# Patient Record
Sex: FEMALE | Race: WHITE | NOT HISPANIC OR LATINO | URBAN - METROPOLITAN AREA
[De-identification: names, ages, dates, MRNs, and addresses within clinical notes are randomized per-mention and may not be internally consistent; named-entity substitution may affect disease eponyms.]

---
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
## 2023-06-07 NOTE — DISCHARGE NOTE PROVIDER - NSFOLLOWUPCLINICS_GEN_ALL_ED_FT
Pediatric Neurology  Pediatric Neurology  [address] [facility] [address] [place] [address]  Phone: [phone]  Fax: [phone]  Follow Up Time: None

## 2023-08-02 NOTE — DISCHARGE NOTE NURSING/CASE MANAGEMENT/SOCIAL WORK - FLU SEASON?
Yes... Dupixent Pregnancy And Lactation Text: This medication likely crosses the placenta but the risk for the fetus is uncertain. This medication is excreted in breast milk.

## 2023-12-16 NOTE — ED PROVIDER NOTE - RESPIRATORY, MLM
:patient's granddaughter states patient's doctor sent patient's medication to the wrong pharmacy. Patient has been off of her anti depressants for 5 days and patient is very anxious     Medication re-sent to local pharmacy per request.      Reason for Disposition  • Patient has refills remaining on their prescription    Protocols used: Medication Refill and Renewal Call-A-AH     Breath sounds clear and equal bilaterally.

## 2024-11-21 NOTE — ED PROVIDER NOTE - NO PERTINENT FAMILY HISTORY
<<----- Click to add NO pertinent Family History no abrasions, no jaundice, no lesions, no pruritis, and no rashes.

## 2025-03-11 NOTE — ED PEDIATRIC NURSE NOTE - CHILD ABUSE FACILITY
Appointment on 3/13/25 with  cancelled per Patient Prompt. Attempted to reach patient to reschedule. Outcome of phone call: LVM for patient to return call to reschedule.    
PIEDAD

## 2025-03-14 ENCOUNTER — APPOINTMENT (OUTPATIENT)
Dept: OBGYN | Facility: CLINIC | Age: 20
End: 2025-03-14
Payer: COMMERCIAL

## 2025-03-14 VITALS
DIASTOLIC BLOOD PRESSURE: 82 MMHG | BODY MASS INDEX: 20.14 KG/M2 | WEIGHT: 118 LBS | HEIGHT: 64 IN | SYSTOLIC BLOOD PRESSURE: 130 MMHG

## 2025-03-14 DIAGNOSIS — Z01.419 ENCOUNTER FOR GYNECOLOGICAL EXAMINATION (GENERAL) (ROUTINE) W/OUT ABNORMAL FINDINGS: ICD-10-CM

## 2025-03-14 DIAGNOSIS — N64.4 MASTODYNIA: ICD-10-CM

## 2025-03-14 DIAGNOSIS — N93.9 ABNORMAL UTERINE AND VAGINAL BLEEDING, UNSPECIFIED: ICD-10-CM

## 2025-03-14 DIAGNOSIS — N89.8 OTHER SPECIFIED NONINFLAMMATORY DISORDERS OF VAGINA: ICD-10-CM

## 2025-03-14 DIAGNOSIS — N63.20 UNSPECIFIED LUMP IN THE LEFT BREAST, UNSPECIFIED QUADRANT: ICD-10-CM

## 2025-03-14 PROCEDURE — 99459 PELVIC EXAMINATION: CPT

## 2025-03-14 PROCEDURE — 99385 PREV VISIT NEW AGE 18-39: CPT

## 2025-03-14 PROCEDURE — 36415 COLL VENOUS BLD VENIPUNCTURE: CPT

## 2025-03-16 LAB
DHEA-S SERPL-MCNC: 306 UG/DL
ESTIMATED AVERAGE GLUCOSE: 94 MG/DL
ESTRADIOL SERPL-MCNC: 38 PG/ML
FSH SERPL-MCNC: 9.6 IU/L
GLUCOSE SERPL-MCNC: 53 MG/DL
HBA1C MFR BLD HPLC: 4.9 %
HCG SERPL-MCNC: <1 MIU/ML
LH SERPL-ACNC: 30.4 IU/L
PROLACTIN SERPL-MCNC: 10.5 NG/ML
T4 FREE SERPL-MCNC: 1.5 NG/DL
TSH SERPL-ACNC: 1.19 UIU/ML

## 2025-03-18 LAB
BV BACTERIA RRNA VAG QL NAA+PROBE: NOT DETECTED
C GLABRATA RNA VAG QL NAA+PROBE: NOT DETECTED
CANDIDA RRNA VAG QL PROBE: NOT DETECTED
T VAGINALIS RRNA SPEC QL NAA+PROBE: NOT DETECTED
TESTOST FREE SERPL-MCNC: 0.7 PG/ML
TESTOST SERPL-MCNC: 53.8 NG/DL

## 2025-03-19 LAB — CYTOLOGY CVX/VAG DOC THIN PREP: NORMAL

## 2025-03-23 LAB — 17OHP SERPL-MCNC: 80 NG/DL

## 2025-04-18 ENCOUNTER — ASOB RESULT (OUTPATIENT)
Age: 20
End: 2025-04-18

## 2025-04-18 ENCOUNTER — APPOINTMENT (OUTPATIENT)
Dept: OBGYN | Facility: CLINIC | Age: 20
End: 2025-04-18
Payer: COMMERCIAL

## 2025-04-18 PROCEDURE — 76856 US EXAM PELVIC COMPLETE: CPT

## 2025-05-16 NOTE — ED PEDIATRIC TRIAGE NOTE - SPO2 (%)
eMERGENCY dEPARTMENT eNCOUnter      CHIEF COMPLAINT    Chief Complaint   Patient presents with    Psychiatric Problem       HPI    Vanessa Lee is a 16 year old female who presents to the emergency department for evaluation.  Patient presents with an apparent overdose.  According to the patient about 3:30 in the morning she admits to taking some Percocet tablets.  She will not tell me exactly how many but just shows me that it was a handful.  She admits to doing cocaine this evening as well and drinking alcohol.  When asked why she did all of this she states \"he broke up with me\".  She has been very nauseated.  She did vomit.  Complaining of some abdominal cramping with this.  When asked if she is suicidal she is not answering the question.  She will not give me really any much more information regarding the events that happened.  She called a friend around 4 AM admitted that she took these tablets.  She was brought to the emergency department this morning    ALLERGIES    ALLERGIES:   Allergen Reactions    Prednisone Other (See Comments)     Throwing things, mental instability, agitation as child       CURRENT MEDICATIONS    Current Facility-Administered Medications   Medication Dose Route Frequency Provider Last Rate Last Admin    sodium chloride 0.9 % injection 10 mL  10 mL Intravenous PRN Yeison Goldberg, DO        sodium chloride 0.9 % injection 2 mL  2 mL Intracatheter 2 times per day Yeison Goldberg, DO         No current outpatient medications on file.       PAST MEDICAL HISTORY    Past Medical History:   Diagnosis Date    Anxiety with depression 04/02/2025    In remission , off meds 6/2024      Urticaria        SURGICAL HISTORY    Past Surgical History:   Procedure Laterality Date    Foot surgery      OVERLAPPING TOES    Tympanostomy tube placement         SOCIAL HISTORY    Social History     Tobacco Use    Smoking status: Never    Smokeless tobacco: Never   Vaping Use    Vaping status:  never used   Substance Use Topics    Alcohol use: Yes     Comment: once every two weeks    Drug use: Not Currently       FAMILY HISTORY    Family History   Adopted: Yes   Problem Relation Age of Onset    Substance Abuse Mother     Asthma Mother     Bipolar disorder Mother     Early death Mother     Bipolar disorder Father     Substance Abuse Father     Bipolar disorder Maternal Grandmother     Substance Abuse Maternal Grandmother     Bipolar disorder Maternal Grandfather     Substance Abuse Maternal Grandfather     Cancer, CNS Maternal Grandfather        REVIEW OF SYSTEMS   Constitutional:  Denies fever or chills. Denies generalized weakness  Eyes:  Denies change in visual acuity. Denies drainage. Denies eye redness. Denies eye pain  HENT:  Denies nasal congestion or sore throat. Denies earache. Denies postnasal drip. Denies difficulty swallowing  Respiratory:  Denies cough or shortness of breath. Denies hemoptysis  Cardiovascular:  Denies chest pain or edema. Denies palpitations. Denies syncope Denies orthopnea.  GI: Positive abdominal cramping, positive nausea, vomiting, denies bloody stools or diarrhea. Denies hematemesis  :  Denies dysuria. Denies urinary frequency. Denies hematuria.   Musculoskeletal:  Denies back pain or joint pain.   Integument:  Denies rash. Denies bruising. Denies laceration or abrasion  Neurologic:  Denies headache, focal weakness. Denies numbness or tingling in any extremity. Denies weakness in any extremity. Denies loss of bladder or bowel control  Endocrine:  Denies polyuria or polydipsia.   Lymphatic:  Denies swollen glands.   Psychiatric:  Denies depression or anxiety.     PHYSICAL EXAM    ED Triage Vitals [05/16/25 0841]   /60   Heart Rate 89   Resp 16   Temp 98.4 °F (36.9 °C)   SpO2 91 %     Pulse Ox Interpretation:  Within normal limits  Constitutional: Appears intoxicated.  Very fidgety.  Eyes:  PERRL, EOMI. Conjunctivae normal. No subconjunctival hemorrhage. No  papilledema. No drainage  HENT:  Atraumatic. External ears normal. Nose normal. Oropharynx moist. Pharynx is nonerythematous. Uvula is midline. Tympanic membranes are pearly gray bilaterally. No TM perforation. No evidence of peritonsillar abscess  Neck: Normal range of motion. No point c-spine tenderness. Supple. No JVD. No carotid bruits. Negative Kernig and Brudzinski signs  Respiratory:  No respiratory distress, normal breath sounds. No rales. No wheezing.   Cardiovascular:  Normal rate, normal rhythm. No murmurs, gallops, or rubs. No thrills  GI:  Soft, nondistended. Normal bowel sounds, nontender. No hepatomegaly, splenomegaly, mass, rebound or guarding. Negative McBurney's point tenderness to palpation. Negative psoas sign. Negative heel jar. No fluid wave on palpation. No pulsatile mass on exam  :  No costovertebral angle tenderness.   Musculoskeletal:  No edema, tenderness, or deformities to any of the 4 extremities. Back - no thoracic or lumbar point tenderness.   Integument:  Well hydrated, no rash. No petechia or purpura. No abrasions or lacerations  Lymphatic:  No anterior or posterior cervical lymphadenopathy noted.   Neurologic:  Alert & oriented x 3.  Normal Mental status.  Normal Cranial Nervies.  EOMI.  IRVIN.  Normal 5/5 muscular strength in both upper and lower extremities.  Normal sensation.  Normal and symmetric reflexes. Normal cerbellar function.  Normal Gait. Negative Babinski. Straight leg raise is negative bilaterally. Negative saddle anesthesia. GCS 15  Psychiatric:  Speech and behavior appropriate.     EKG Interpretation  Rate: 95  Rhythm: normal sinus rhythm   Abnormality: no    EKG tracing interpreted by ED physician          RADIOLOGY    XR CHEST PA OR AP 1 VIEW   Final Result   Impression: Normal portable upright chest radiograph.      Electronically Signed by: Roni Bustillo M.D.   Signed on: 5/16/2025 9:08 AM   Created on Workstation ID: [id number]   Signed on Workstation ID:  [id number]          I have independently reviewed radiology images and impressions, ***, pending Radiology final read.    LABS    Results for orders placed or performed during the hospital encounter of 05/16/25   Comprehensive Metabolic Panel    Specimen: Blood, Venous   Result Value    Fasting Status     Sodium 144    Potassium 3.3 (L)    Chloride 107    Carbon Dioxide 20 (L)    Anion Gap 20 (H)    Glucose 100 (H)    BUN 5 (L)    Creatinine 0.66    Glomerular Filtration Rate >90     Comment: eGFR results = or >60 mL/min/1.73m2 = Normal kidney function. Estimated GFR calculated using the CKD-EPI-R (2021) equation that does not include race in the creatinine calculation.    BUN/Cr 8    Calcium 9.5    Bilirubin, Total 0.6    GOT/AST 12    GPT/ALT 16    Alkaline Phosphatase 74    Albumin 4.1    Protein, Total 7.5    Globulin 3.4    A/G Ratio 1.2   Acetaminophen Level    Specimen: Blood, Venous   Result Value    Acetaminophen 70 (H)   Salicylate Level    Specimen: Blood, Venous   Result Value    Salicylate <3.0   Drug Abuse Screen, Urine    Specimen: Urine clean catch   Result Value    Amphetamines, Urine Negative     Comment: Cutoff = 500 ng/mL    Barbiturates, Urine Negative     Comment: Cutoff = 200 ng/mL    Benzodiazepines, Urine Negative     Comment: Cutoff = 200 ng/mL    Cocaine/ Metabolite, Urine Negative     Comment: Cutoff = 150 ng/ml    Opiates, Urine Negative     Comment: Cutoff = 300 ng/mL    Phencyclidine, Urine Negative     Comment: Cutoff = 25 ng/mL    Cannabinoids, Urine Positive (A)     Comment: Cutoff = 50 ng/mL    Fentanyl, Urine Screen Negative     Comment: Cutoff = 1.0 ng/mL   Alcohol    Specimen: Blood, Venous   Result Value    Alcohol 109 (H)   Lipase    Specimen: Blood, Venous   Result Value    Lipase 21   TROPONIN I, HIGH SENSITIVITY    Specimen: Blood, Venous   Result Value    Troponin I, High Sensitivity <4   Urinalysis & Reflex Microscopy With Culture If Indicated    Specimen: Urine clean  catch   Result Value    COLOR, URINALYSIS Straw    APPEARANCE, URINALYSIS Clear    GLUCOSE, URINALYSIS Negative    BILIRUBIN, URINALYSIS Negative    KETONES, URINALYSIS 40 (A)    SPECIFIC GRAVITY, URINALYSIS 1.025     Comment: Measured by refractometry    OCCULT BLOOD, URINALYSIS Negative    PH, URINALYSIS 6.0    PROTEIN, URINALYSIS Negative    UROBILINOGEN, URINALYSIS 0.2    NITRITE, URINALYSIS Negative    LEUKOCYTE ESTERASE, URINALYSIS Negative   Pregnancy Test, Urine    Specimen: Urine   Result Value    Pregnancy, Urine Positive (A)   CBC with Automated Differential (performable only)    Specimen: Blood, Venous   Result Value    WBC 8.7    RBC 4.21    HGB 13.3    HCT 37.3    MCV 88.6    MCH 31.6    MCHC 35.7    RDW-CV 11.9    RDW-SD 37.9 (L)        NRBC 0    Neutrophil, Percent 76    Lymphocytes, Percent 18    Mono, Percent 5    Eosinophils, Percent 0    Basophils, Percent 1    Immature Granulocytes 0    Absolute Neutrophils 6.7    Absolute Lymphocytes 1.5    Absolute Monocytes 0.4    Absolute Eosinophils  0.0    Absolute Basophils 0.0    Absolute Immature Granulocytes 0.0   Pregnancy Test, Serum Qualitative    Specimen: Blood, Venous   Result Value    HCG, Qualitative Positive (A)     Comment: False positive results may occur with patients who have heterophile antibodies. If heterophile antibody interference is suspected, recommend urine pregnancy test which is not susceptible to the interference. A negative urine HCG result with persistently positive serum HCG results suggests a false positive serum result. Contact the laboratory for further guidance.   Acetaminophen Level    Specimen: Blood, Venous   Result Value    Acetaminophen 47 (H)       I have reviewed labs.        PROCEDURES    ***    Medications   sodium chloride 0.9 % injection 10 mL (has no administration in time range)   sodium chloride 0.9 % injection 2 mL (2 mLs Intracatheter Not Given 5/16/25 6423)   ondansetron (ZOFRAN) injection 4 mg (4  mg Intravenous Given 5/16/25 0917)   sodium chloride (NORMAL SALINE) 0.9 % bolus 1,000 mL (0 mLs Intravenous Completed 5/16/25 0959)        {MDM:[id number]}      ED COURSE  {Chart Review (Optional):985118583}    8:45 AM-due to this patient's presentation upon arrival she will have an IV established.  Labs have been drawn and sent at this point.  She will be given some antiemetics as she has nauseated.  I was able to review her history extensively.  The mom of the patient called the emergency department and said she probably took about 5-6 Percocet tablets and then vomited afterwards.    9:30 AM-patient admits to me she also took probably about 15 Tylenol tablets in addition to everything else.    9:45 AM-white count is within normal limits.    10:00 AM-alcohol level is elevated at 109.    10:20 AM-patient remains in no distress.  Her initial acetaminophen level is at 70.  Poison control would like a repeat level at 11:00 this morning.  She remains in no distress.    10:45 AM-pregnancy test did come back positive.    11:25 AM-no acute distress at this time.  Patient's drug screen is positive for marijuana.    12:00 Pm-repeat acetaminophen levels at 47.  Per poison control patient is cleared from their standpoint.  [facility]ene has evaluated the patient and will touch base with crisis given her presentation today    1:00 PM-no acute distress at this time.  Patient is resting comfortably    3:05 PM-I did speak with Dr. Fiore the psychiatrist at [facility].  She accepts patient for transfer for psychiatric admission      FINAL IMPRESSION      Clinical Impression and Diagnosis  3:12 PM     Diagnosis:   No diagnosis found.       Pt to be transferred to [facility].  Dr. Fiore accepting.      Condition on Admission: Stable     Intentional overdose, initial encounter (CMD)    2. Depression, unspecified depression type           Pt to be transferred to [facility].  Dr. Fiore accepting.      Condition on Admission: Stable       Yeison Goldberg,   05/25/25 1422     99

## 2025-08-26 ENCOUNTER — OUTPATIENT (OUTPATIENT)
Dept: OUTPATIENT SERVICES | Facility: HOSPITAL | Age: 20
LOS: 1 days | End: 2025-08-26
Payer: COMMERCIAL

## 2025-08-26 ENCOUNTER — APPOINTMENT (OUTPATIENT)
Dept: ULTRASOUND IMAGING | Facility: HOSPITAL | Age: 20
End: 2025-08-26
Payer: COMMERCIAL

## 2025-08-26 ENCOUNTER — RESULT REVIEW (OUTPATIENT)
Age: 20
End: 2025-08-26

## 2025-08-26 DIAGNOSIS — N63.20 UNSPECIFIED LUMP IN THE LEFT BREAST, UNSPECIFIED QUADRANT: ICD-10-CM

## 2025-08-26 PROCEDURE — 76641 ULTRASOUND BREAST COMPLETE: CPT | Mod: 26,50

## 2025-08-26 PROCEDURE — 76641 ULTRASOUND BREAST COMPLETE: CPT
